# Patient Record
Sex: FEMALE | Race: WHITE | NOT HISPANIC OR LATINO | Employment: UNEMPLOYED | URBAN - METROPOLITAN AREA
[De-identification: names, ages, dates, MRNs, and addresses within clinical notes are randomized per-mention and may not be internally consistent; named-entity substitution may affect disease eponyms.]

---
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
## 2025-03-29 DIAGNOSIS — N92.1 MENORRHAGIA WITH IRREGULAR CYCLE: ICD-10-CM

## 2025-03-31 RX ORDER — ETHYNODIOL DIACETATE AND ETHINYL ESTRADIOL 1 MG-50MCG
1 KIT ORAL DAILY
Qty: 84 TABLET | Refills: 0 | Status: SHIPPED | OUTPATIENT
Start: 2025-03-31

## 2025-06-18 ENCOUNTER — VIRTUAL VISIT (OUTPATIENT)
Dept: FAMILY MEDICINE | Facility: CLINIC | Age: 20
End: 2025-06-18
Payer: COMMERCIAL

## 2025-06-18 DIAGNOSIS — N92.1 MENORRHAGIA WITH IRREGULAR CYCLE: ICD-10-CM

## 2025-06-18 PROCEDURE — 98006 SYNCH AUDIO-VIDEO EST MOD 30: CPT | Performed by: PHYSICIAN ASSISTANT

## 2025-06-18 RX ORDER — ETHYNODIOL DIACETATE AND ETHINYL ESTRADIOL 1 MG-50MCG
1 KIT ORAL DAILY
Qty: 84 TABLET | Refills: 1 | Status: SHIPPED | OUTPATIENT
Start: 2025-06-18

## 2025-06-18 NOTE — PROGRESS NOTES
Rusty is a 20 year old who is being evaluated via a billable video visit.    How would you like to obtain your AVS? MyChart  If the video visit is dropped, the invitation should be resent by: Text to cell phone: [phone]  Will anyone else be joining your video visit? No      Assessment & Plan     Menorrhagia with irregular cycle  Bleeding and dysmenorrhea are well controlled on this OCP. Tolerating well. Refilling x 6mo  No concerns for STIs. Stable monogamous relationship  Has not been seen in person for almost 2 yrs. She will schedule an in person physical before she is out of refills of her OCP.   - ethynodiol-ethinyl estradiol (ZOVIA) 1-50 MG-MCG tablet; Take 1 tablet by mouth daily.            Follow Up: see above. Additionally patient was instructed to contact clinic for worsening symptoms, non-improvement in time frame discussed, and for questions regarding treatment plan.   For virtual visits, the patient was advised to be seen for in person evaluation if symptoms or condition are worsening or non-improvement as expected.   Debbie Cohn PA-C    Subjective   Rusty is a 20 year old, presenting for the following health issues:  Recheck Medication      6/18/2025     4:23 PM   Additional Questions   Roomed by BT     History of Present Illness       Reason for visit:  Need more refills on birth control She is missing 7 dose(s) of medications per week.  She is not taking prescribed medications regularly due to other.        Dysmenorrhea and contraception- On zovia 1-50 for at least 2 years.   Bleeding is regular and monthly for 4-5 days.   Heavy bleeding on day 1 then lightens. Changes a regular Tampon every 4 hrs.   Cramps are controlled on her pill.   No side effects.   Previously tried Apri and ortho-cyclen. But still had excessive bleeding and dysmenorrhea (with ER visits) until she was switched to zovia. This has worked well for her.   Sexually active - with partner for 2 yrs. No STI concerns.      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   Was a student at [employer] but taking the next year off.   Exercise- go on walks.     No smoking or vaping  No history of migraines or BP problems         Review of Systems  Constitutional, HEENT, cardiovascular, pulmonary, gi and gu systems are negative, except as otherwise noted.      Objective           Vitals:  No vitals were obtained today due to virtual visit.    Physical Exam   GENERAL: alert and no distress  EYES: Eyes grossly normal to inspection.  No discharge or erythema, or obvious scleral/conjunctival abnormalities.  HENT: Normal cephalic/atraumatic.  External ears, nose and mouth without ulcers or lesions.  No nasal drainage visible.  RESP: No audible wheeze, cough, or visible cyanosis.    SKIN: Visible skin clear. No significant rash, abnormal pigmentation or lesions.  NEURO: Cranial nerves grossly intact.  Mentation and speech appropriate for age.  PSYCH: Appropriate affect, tone, and pace of words          Video-Visit Details    Type of service:  Video Visit   Originating Location (pt. Location): Home    Distant Location (provider location):  On-site  Platform used for Video Visit: Lulú  Signed Electronically by: Debbie Cohn PA-C